# Patient Record
(demographics unavailable — no encounter records)

---

## 2024-10-11 NOTE — DISCUSSION/SUMMARY
[FreeTextEntry1] : Patient is a 15 yo F who [facility] the [facility]hea with associated nausea that started this morning.   Patient denies back pain, diarrhea, headache, vomiting, or URI symptoms.   During visit patient had an episode of emesis which consistent of undigested food.  On exam TTP noted to bilateral lower abdomen. No rebound noted, completed single leg hop without pain. Vitals stable.   Plan: -Warm pack provided -(1) Ibuprofen 400 mg given PO, tolerated, patient declined any medication for nausea -Allowed patient to rest in clinic -Patient reports she feels well enough to return to class. Educated on continued supportive care and for new or worsening symptoms to RTC and she verbalized understanding.  -Encouraged to verbalize any questions or concerns, all questions answered at this time.  -Encouraged to RTC for STI testing as her last testing was May 2023.  Behavioral health history reviewed and anticipatory guidance provided

## 2024-10-11 NOTE — PHYSICAL EXAM
[Symmetric Chest Wall] : symmetric chest wall [Soft] : soft [Normal Bowel Sounds] : normal bowel sounds [Tenderness with Palpation] : tenderness with palpation [NL] : warm, clear [Tenderness] : no tenderness [Laceration] : no laceration [Ecchymosis] : no ecchymosis [Swelling] : no swelling [Traumatic] : atraumatic [Tenderness With Palpation of Chest Wall] : no tenderness with palpation of chest wall [Distended] : nondistended [Hepatosplenomegaly] : no hepatosplenomegaly [Splenomegaly] : no splenomegaly [Hepatomegaly] : no hepatomegaly [Mass] : no mass palpable [McBurney's point tenderness] : no McBurney's point tenderness [Rebound tenderness] : no rebound tenderness [Psoas Sign Positive] : psoas sign negative [Obturator Sign Positive] : obturator sign negative [FreeTextEntry9] : Bilateral lower abdominal TTP [de-identified] : Completed single leg hop without pain

## 2024-10-11 NOTE — HISTORY OF PRESENT ILLNESS
[FreeTextEntry6] : Patient is a 15 yo F who presents to the [facility] for dysmenorrhea with associated nausea that started this morning.   Patient denies back pain, diarrhea, headache, vomiting, or URI symptoms.   Reports she has regular monthly menstrual cycles that last 4-5 days  She is unsure of how many pads she uses and states bleeding is light throughout.  Takes Acetaminophen or Ibuprofen with relief as needed. No medications taken today.  Pain 10/10

## 2024-10-11 NOTE — DISCUSSION/SUMMARY
[FreeTextEntry1] : Patient is a 15 yo F who presents to the [facility] for dysmenorrhea with associated nausea that started this morning.   Patient denies back pain, diarrhea, headache, vomiting, or URI symptoms.   During visit patient had an episode of emesis which consistent of undigested food.  On exam TTP noted to bilateral lower abdomen. No rebound noted, completed single leg hop without pain. Vitals stable.   Plan: -Warm pack provided -(1) Ibuprofen 400 mg given PO, tolerated, patient declined any medication for nausea -Allowed patient to rest in clinic -Patient reports she feels well enough to return to class. Educated on continued supportive care and for new or worsening symptoms to RTC and she verbalized understanding.  -Encouraged to verbalize any questions or concerns, all questions answered at this time.  -Encouraged to RTC for STI testing as her last testing was May 2023.  Behavioral health history reviewed and anticipatory guidance provided

## 2024-10-11 NOTE — HISTORY OF PRESENT ILLNESS
[FreeTextEntry6] : Patient is a 15 yo F who presents to the [facility] for dysmenorrhea with associated nausea that started this morning.   Patient denies back pain, diarrhea, headache, vomiting, or URI symptoms.   Reports she has regular monthly menstrual cycles that last 4-5 days  She is unsure of how many pads she uses and states bleeding is light throughout.  Takes Acetaminophen or Ibuprofen with relief as needed. No medications taken today.  Pain 10/10

## 2024-10-11 NOTE — PHYSICAL EXAM
[Symmetric Chest Wall] : symmetric chest wall [Soft] : soft [Normal Bowel Sounds] : normal bowel sounds [Tenderness with Palpation] : tenderness with palpation [NL] : warm, clear [Tenderness] : no tenderness [Laceration] : no laceration [Ecchymosis] : no ecchymosis [Swelling] : no swelling [Traumatic] : atraumatic [Tenderness With Palpation of Chest Wall] : no tenderness with palpation of chest wall [Distended] : nondistended [Hepatosplenomegaly] : no hepatosplenomegaly [Splenomegaly] : no splenomegaly [Hepatomegaly] : no hepatomegaly [Mass] : no mass palpable [McBurney's point tenderness] : no McBurney's point tenderness [Rebound tenderness] : no rebound tenderness [Psoas Sign Positive] : psoas sign negative [Obturator Sign Positive] : obturator sign negative [FreeTextEntry9] : Bilateral lower abdominal TTP [de-identified] : Completed single leg hop without pain

## 2024-12-01 NOTE — PHYSICAL EXAM
[Moves All Extremities x 4] : moves all extremities x4 [Warm, Well Perfused x4] : warm, well perfused x4 [Capillary Refill <2s] : capillary refill < 2s [NL] : warm, clear [de-identified] : Right thumb: pink and no deformities. Artificial nail intact no active bleeding noted. Patient has FROM of finger without pain.

## 2024-12-01 NOTE — DISCUSSION/SUMMARY
[FreeTextEntry1] : Patient is a 15 yo F who presents to the [facility] for a fingernail injury to her right thumb that occurred at gym class. Patient reports her artificial nail hit the hard surface of the gym floor then her nail bent, felt pain, then her nail started to bleed.    Right thumb: pink and no deformities. Artificial nail intact no active bleeding noted. Patient has FROM of finger without pain, unable to assess natural nail  Plan: -Patient declines to have site cleaned/wash her hand  -Ice pack provided and (2) Acetaminophen 325 mg given, tolerated. -Outreach completed to patients mother at [phone] and made aware of injury -Educated patient/mother on infection control and need to RTC to nail salon to have artificial nail removed under sanitary conditions and they verbalized understanding. -Educated on signs and symptoms of infection and to RTC or seek urgent care  -Encouraged to verbalize any questions or concerns, all questions answered at this time

## 2024-12-01 NOTE — HISTORY OF PRESENT ILLNESS
[FreeTextEntry6] : Patient is a 15 yo F who presents to the [facility] for a fingernail injury to her right thumb that occurred at gym class. Patient reports her artificial nail hit the hard surface of the gym floor then her nail bent, felt pain, then her nail started to bleed.  Denies any previous injuries to nail or thumb.   Pain 10/10.

## 2024-12-01 NOTE — DISCUSSION/SUMMARY
[FreeTextEntry1] : Patient is a 17 yo F who presents to the [facility] for a fingernail injury to her right thumb that occurred at gym class. Patient reports her artificial nail hit the hard surface of the gym floor then her nail bent, felt pain, then her nail started to bleed.    Right thumb: pink and no deformities. Artificial nail intact no active bleeding noted. Patient has FROM of finger without pain, unable to assess natural nail  Plan: -Patient declines to have site cleaned/wash her hand  -Ice pack provided and (2) Acetaminophen 325 mg given, tolerated. -Outreach completed to patients mother at [phone] and made aware of injury -Educated patient/mother on infection control and need to RTC to nail salon to have artificial nail removed under sanitary conditions and they verbalized understanding. -Educated on signs and symptoms of infection and to RTC or seek urgent care  -Encouraged to verbalize any questions or concerns, all questions answered at this time

## 2024-12-01 NOTE — PHYSICAL EXAM
[Moves All Extremities x 4] : moves all extremities x4 [Warm, Well Perfused x4] : warm, well perfused x4 [Capillary Refill <2s] : capillary refill < 2s [NL] : warm, clear [de-identified] : Right thumb: pink and no deformities. Artificial nail intact no active bleeding noted. Patient has FROM of finger without pain.

## 2024-12-01 NOTE — HISTORY OF PRESENT ILLNESS
[FreeTextEntry6] : Patient is a 17 yo F who presents to the [facility] for a fingernail injury to her right thumb that occurred at gym class. Patient reports her artificial nail hit the hard surface of the gym floor then her nail bent, felt pain, then her nail started to bleed.  Denies any previous injuries to nail or thumb.   Pain 10/10.

## 2025-01-06 NOTE — REVIEW OF SYSTEMS
[Chills] : chills [Malaise] : malaise [Headache] : headache [Sore Throat] : sore throat [Negative] : Genitourinary [Cough] : cough [Fever] : no fever [Difficulty with Sleep] : no difficulty with sleep [Change in Weight] : no change in weight [Night Sweats] : no night sweats [Eye Discharge] : no eye discharge [Eye Redness] : no eye redness [Itchy Eyes] : no itchy eyes [Changes in Vision] : no changes in vision [Ear Pain] : no ear pain [Nasal Discharge] : no nasal discharge [Nasal Congestion] : no nasal congestion [Snoring] : no snoring [Sinus Pressure] : no sinus pressure [Tachypnea] : not tachypneic [Wheezing] : no wheezing [Congestion] : no congestion [Shortness of Breath] : no shortness of breath

## 2025-01-06 NOTE — DISCUSSION/SUMMARY
[FreeTextEntry1] : Patient is a 17 yo F who presents to the [facility] for dry cough, body aches, chills, generalized headache, and a sore throat that started yesterday.   Patient reports he was around her friend for the whole weekend but unsure if she was sick  Denies n/v, blurry vision, abdominal pain, rash, recent travel, or other symptoms.  Had an episode of diarrhea this morning, unsure of color as she did not look.   Patient noted to be febrile to 100.4  Viral Illness -Covid/Flu/RSV PCR panel sent -Rapid Strep negative, culture sent -(2) Acetaminophen 325 mg given PO and (3 )sore throat lozenges dispensed.  -Outreach completed to patients mother Ms. Machado at [phone] on visit and plan of care.  -Educated mother and patient on supportive care, return to school guidelines, and for new, persistent, or worsening symptoms to RTC, UC, or PMD and they verbalized understanding.  -Mother is unable to pick patient up from school and mother made outreach to other family members as well. [facility] made aware and patient will remain at [facility] until dismissal. Temperature reassessed 98.2. -Patient reports he feels well to go home and dismissed from the [facility]

## 2025-01-06 NOTE — PHYSICAL EXAM
[Tired appearing] : tired appearing [Erythematous Oropharynx] : erythematous oropharynx [Symmetric Chest Wall] : symmetric chest wall [Tender] : tender [Anterior Cervical] : anterior cervical [NL] : warm, clear [Regular Rate and Rhythm] : regular rate and rhythm [Normal S1, S2 audible] : normal S1, S2 audible [Tachycardia] : tachycardia [Acute Distress] : no acute distress [Alert] : not alert [Lethargic] : not lethargic [Toxic] : not toxic [Stridor] : no stridor [Tenderness] : no tenderness [Laceration] : no laceration [Ecchymosis] : no ecchymosis [Swelling] : no swelling [Traumatic] : atraumatic [Cobblestoning] : no cobblestoning of posterior pharynx [Inflamed Gingiva] : gingiva not inflamed [Bleeding Gingiva] : gingiva not bleeding [Inflamed Tongue] : tongue not inflamed [Enlarged Tonsils] : tonsils not enlarged [Vesicles] : no vesicles [Exudate] : no exudate [Ulcerative Lesions] : no ulcerative lesions [Palate petechiae] : palate without petechiae [Tenderness With Palpation of Chest Wall] : no tenderness with palpation of chest wall [Murmur] : no murmur [de-identified] : Bilateral

## 2025-01-06 NOTE — PHYSICAL EXAM
[Tired appearing] : tired appearing [Erythematous Oropharynx] : erythematous oropharynx [Symmetric Chest Wall] : symmetric chest wall [Tender] : tender [Anterior Cervical] : anterior cervical [NL] : warm, clear [Regular Rate and Rhythm] : regular rate and rhythm [Normal S1, S2 audible] : normal S1, S2 audible [Tachycardia] : tachycardia [Acute Distress] : no acute distress [Alert] : not alert [Lethargic] : not lethargic [Toxic] : not toxic [Stridor] : no stridor [Tenderness] : no tenderness [Laceration] : no laceration [Ecchymosis] : no ecchymosis [Swelling] : no swelling [Traumatic] : atraumatic [Cobblestoning] : no cobblestoning of posterior pharynx [Inflamed Gingiva] : gingiva not inflamed [Bleeding Gingiva] : gingiva not bleeding [Inflamed Tongue] : tongue not inflamed [Enlarged Tonsils] : tonsils not enlarged [Vesicles] : no vesicles [Exudate] : no exudate [Ulcerative Lesions] : no ulcerative lesions [Palate petechiae] : palate without petechiae [Tenderness With Palpation of Chest Wall] : no tenderness with palpation of chest wall [Murmur] : no murmur [de-identified] : Bilateral

## 2025-01-06 NOTE — HISTORY OF PRESENT ILLNESS
[FreeTextEntry6] : Patient is a 15 yo F who presents to the [facility] for dry cough, body aches, chills, generalized headache, and a sore throat that started yesterday.   Patient reports he was around her friend for the whole weekend but unsure if she was sick  Denies n/v, blurry vision, abdominal pain, rash, recent travel, or other symptoms.  Had an episode of diarrhea this morning, unsure of color as she did not look.   Ate cereal today but felt nauseous.  Has not taken any medications for her symptoms since onset.

## 2025-01-06 NOTE — HISTORY OF PRESENT ILLNESS
[FreeTextEntry6] : Patient is a 15 yo F who presents to the [facility] for dry cough, body aches, chills, generalized headache, and a sore throat that started yesterday.   Patient reports he was around her friend for the whole weekend but unsure if she was sick  Denies n/v, blurry vision, abdominal pain, rash, recent travel, or other symptoms.  Had an episode of diarrhea this morning, unsure of color as she did not look.   Ate cereal today but felt nauseous.  Has not taken any medications for her symptoms since onset.